# Patient Record
Sex: FEMALE | URBAN - METROPOLITAN AREA
[De-identification: names, ages, dates, MRNs, and addresses within clinical notes are randomized per-mention and may not be internally consistent; named-entity substitution may affect disease eponyms.]

---

## 2017-05-16 ENCOUNTER — APPOINTMENT (RX ONLY)
Dept: URBAN - METROPOLITAN AREA CLINIC 69 | Facility: CLINIC | Age: 27
Setting detail: DERMATOLOGY
End: 2017-05-16

## 2017-05-16 DIAGNOSIS — O26.86 PRURITIC URTICARIAL PAPULES AND PLAQUES OF PREGNANCY (PUPPP): ICD-10-CM

## 2017-05-16 PROBLEM — L30.9 DERMATITIS, UNSPECIFIED: Status: ACTIVE | Noted: 2017-05-16

## 2017-05-16 PROCEDURE — ? COUNSELING

## 2017-05-16 PROCEDURE — 99202 OFFICE O/P NEW SF 15 MIN: CPT

## 2017-05-16 PROCEDURE — ? TREATMENT REGIMEN

## 2017-05-16 PROCEDURE — ? PRESCRIPTION

## 2017-05-16 RX ORDER — DESONIDE 0.5 MG/G
CREAM TOPICAL
Qty: 1 | Refills: 1 | Status: ERX | COMMUNITY
Start: 2017-05-16

## 2017-05-16 RX ADMIN — DESONIDE: 0.5 CREAM TOPICAL at 21:30

## 2017-05-16 ASSESSMENT — LOCATION ZONE DERM: LOCATION ZONE: TRUNK

## 2017-05-16 ASSESSMENT — LOCATION DETAILED DESCRIPTION DERM: LOCATION DETAILED: RIGHT RIB CAGE

## 2017-05-16 ASSESSMENT — LOCATION SIMPLE DESCRIPTION DERM: LOCATION SIMPLE: ABDOMEN

## 2017-05-16 NOTE — PROCEDURE: TREATMENT REGIMEN
Initiate Treatment: Desonide cream-Apply thin layer to affected areas on right abdomen twice a day x 2 weeks then stop
Otc Regimen: Cerave cream - use as moisturizer daily ( can put in refrigerator)
Plan: Return to clinic earlier if getting worse , blisters
Detail Level: Zone